# Patient Record
Sex: FEMALE | Race: OTHER | NOT HISPANIC OR LATINO | Employment: FULL TIME | URBAN - METROPOLITAN AREA
[De-identification: names, ages, dates, MRNs, and addresses within clinical notes are randomized per-mention and may not be internally consistent; named-entity substitution may affect disease eponyms.]

---

## 2021-12-10 ENCOUNTER — HOSPITAL ENCOUNTER (EMERGENCY)
Facility: MEDICAL CENTER | Age: 21
End: 2021-12-10
Attending: EMERGENCY MEDICINE
Payer: MEDICAID

## 2021-12-10 VITALS
WEIGHT: 126.98 LBS | DIASTOLIC BLOOD PRESSURE: 70 MMHG | HEART RATE: 96 BPM | OXYGEN SATURATION: 96 % | SYSTOLIC BLOOD PRESSURE: 111 MMHG | TEMPERATURE: 97.1 F | RESPIRATION RATE: 16 BRPM

## 2021-12-10 DIAGNOSIS — N93.8 DYSFUNCTIONAL UTERINE BLEEDING: ICD-10-CM

## 2021-12-10 LAB
ALBUMIN SERPL BCP-MCNC: 4.4 G/DL (ref 3.2–4.9)
ALBUMIN/GLOB SERPL: 1.2 G/DL
ALP SERPL-CCNC: 85 U/L (ref 30–99)
ALT SERPL-CCNC: 9 U/L (ref 2–50)
ANION GAP SERPL CALC-SCNC: 10 MMOL/L (ref 7–16)
APTT PPP: 33.7 SEC (ref 24.7–36)
AST SERPL-CCNC: 18 U/L (ref 12–45)
BASOPHILS # BLD AUTO: 0.6 % (ref 0–1.8)
BASOPHILS # BLD: 0.04 K/UL (ref 0–0.12)
BILIRUB SERPL-MCNC: 0.2 MG/DL (ref 0.1–1.5)
BUN SERPL-MCNC: 9 MG/DL (ref 8–22)
CALCIUM SERPL-MCNC: 9.5 MG/DL (ref 8.4–10.2)
CHLORIDE SERPL-SCNC: 102 MMOL/L (ref 96–112)
CO2 SERPL-SCNC: 25 MMOL/L (ref 20–33)
CREAT SERPL-MCNC: 0.46 MG/DL (ref 0.5–1.4)
EOSINOPHIL # BLD AUTO: 0.11 K/UL (ref 0–0.51)
EOSINOPHIL NFR BLD: 1.7 % (ref 0–6.9)
ERYTHROCYTE [DISTWIDTH] IN BLOOD BY AUTOMATED COUNT: 44.7 FL (ref 35.9–50)
GLOBULIN SER CALC-MCNC: 3.7 G/DL (ref 1.9–3.5)
GLUCOSE SERPL-MCNC: 82 MG/DL (ref 65–99)
HCG SERPL QL: NEGATIVE
HCT VFR BLD AUTO: 37.3 % (ref 37–47)
HGB BLD-MCNC: 11.4 G/DL (ref 12–16)
IMM GRANULOCYTES # BLD AUTO: 0.01 K/UL (ref 0–0.11)
IMM GRANULOCYTES NFR BLD AUTO: 0.2 % (ref 0–0.9)
INR PPP: 0.97 (ref 0.87–1.13)
LYMPHOCYTES # BLD AUTO: 3.25 K/UL (ref 1–4.8)
LYMPHOCYTES NFR BLD: 49 % (ref 22–41)
MCH RBC QN AUTO: 25.1 PG (ref 27–33)
MCHC RBC AUTO-ENTMCNC: 30.6 G/DL (ref 33.6–35)
MCV RBC AUTO: 82.2 FL (ref 81.4–97.8)
MONOCYTES # BLD AUTO: 0.47 K/UL (ref 0–0.85)
MONOCYTES NFR BLD AUTO: 7.1 % (ref 0–13.4)
NEUTROPHILS # BLD AUTO: 2.75 K/UL (ref 2–7.15)
NEUTROPHILS NFR BLD: 41.4 % (ref 44–72)
NRBC # BLD AUTO: 0 K/UL
NRBC BLD-RTO: 0 /100 WBC
PLATELET # BLD AUTO: 395 K/UL (ref 164–446)
PMV BLD AUTO: 8.7 FL (ref 9–12.9)
POTASSIUM SERPL-SCNC: 3.9 MMOL/L (ref 3.6–5.5)
PROT SERPL-MCNC: 8.1 G/DL (ref 6–8.2)
PROTHROMBIN TIME: 12.1 SEC (ref 12–14.6)
RBC # BLD AUTO: 4.54 M/UL (ref 4.2–5.4)
SODIUM SERPL-SCNC: 137 MMOL/L (ref 135–145)
WBC # BLD AUTO: 6.6 K/UL (ref 4.8–10.8)

## 2021-12-10 PROCEDURE — 99284 EMERGENCY DEPT VISIT MOD MDM: CPT

## 2021-12-10 PROCEDURE — 84703 CHORIONIC GONADOTROPIN ASSAY: CPT

## 2021-12-10 PROCEDURE — 85025 COMPLETE CBC W/AUTO DIFF WBC: CPT

## 2021-12-10 PROCEDURE — 85730 THROMBOPLASTIN TIME PARTIAL: CPT

## 2021-12-10 PROCEDURE — 85610 PROTHROMBIN TIME: CPT

## 2021-12-10 PROCEDURE — 80053 COMPREHEN METABOLIC PANEL: CPT

## 2021-12-10 RX ORDER — NORGESTIMATE AND ETHINYL ESTRADIOL 7DAYSX3 28
1 KIT ORAL DAILY
Qty: 90 TABLET | Refills: 4 | Status: SHIPPED | OUTPATIENT
Start: 2021-12-10 | End: 2022-03-10

## 2021-12-11 NOTE — ED TRIAGE NOTES
Chief Complaint   Patient presents with   • Vaginal Bleeding     heavily bleeding for over 1 month, descibed as dark red and recently started passing clots, c/o dizziness     /75   Pulse 82   Temp 35.8 °C (96.5 °F) (Temporal)   Resp 16   SpO2 98%     Pt BIB family for above concern, pt states after receiving the COVID vaccine her periods have been irregular    Has this patient been vaccinated for COVID - YES  If not, would they like to be vaccinated while in the ER if eligible?  n/a  Would the patient like to speak with the ERP about the possibility of receiving the COVID vaccine today before making a decision? n/a

## 2021-12-11 NOTE — ED PROVIDER NOTES
ED Provider Note    CHIEF COMPLAINT  Chief Complaint   Patient presents with   • Vaginal Bleeding     heavily bleeding for over 1 month, descibed as dark red and recently started passing clots, c/o dizziness       HPI  Kathie Vazquez is a 21 y.o. female who presents for evaluation of heavy vaginal bleeding lightheadedness dizziness.  The patient has a history of dysfunctional uterine bleeding.  The patient is a recent refugee from [place].  She had previously been on birth control which did seem to mitigate her heavy menstrual cycles.  She has never been sexually active and denies possibility of pregnancy.  She does not take any anticoagulants or blood thinners.  No family or personal history of bleeding disorder.  She is accompanied by her sister-in-law.  No other symptoms such as high fevers chills night sweats weight loss.    REVIEW OF SYSTEMS  See HPI for further details.  Positive for lightheadedness and dizziness all other systems are negative.     PAST MEDICAL HISTORY  No past medical history on file.  Dysfunctional uterine bleeding  FAMILY HISTORY  No history of bleeding disorder    SOCIAL HISTORY  Social History     Socioeconomic History   • Marital status: Not on file     Spouse name: Not on file   • Number of children: Not on file   • Years of education: Not on file   • Highest education level: Not on file   Occupational History   • Not on file   Tobacco Use   • Smoking status: Never Smoker   • Smokeless tobacco: Never Used   Substance and Sexual Activity   • Alcohol use: Not Currently   • Drug use: Never   • Sexual activity: Not on file   Other Topics Concern   • Not on file   Social History Narrative   • Not on file     Social Determinants of Health     Financial Resource Strain:    • Difficulty of Paying Living Expenses: Not on file   Food Insecurity:    • Worried About Running Out of Food in the Last Year: Not on file   • Ran Out of Food in the Last Year: Not on file   Transportation Needs:    • Lack  of Transportation (Medical): Not on file   • Lack of Transportation (Non-Medical): Not on file   Physical Activity:    • Days of Exercise per Week: Not on file   • Minutes of Exercise per Session: Not on file   Stress:    • Feeling of Stress : Not on file   Social Connections:    • Frequency of Communication with Friends and Family: Not on file   • Frequency of Social Gatherings with Friends and Family: Not on file   • Attends Gnosticism Services: Not on file   • Active Member of Clubs or Organizations: Not on file   • Attends Club or Organization Meetings: Not on file   • Marital Status: Not on file   Intimate Partner Violence:    • Fear of Current or Ex-Partner: Not on file   • Emotionally Abused: Not on file   • Physically Abused: Not on file   • Sexually Abused: Not on file   Housing Stability:    • Unable to Pay for Housing in the Last Year: Not on file   • Number of Places Lived in the Last Year: Not on file   • Unstable Housing in the Last Year: Not on file     [place] refugee  SURGICAL HISTORY  No past surgical history on file.  No surgeries  CURRENT MEDICATIONS  Home Medications    **Home medications have not yet been reviewed for this encounter**         ALLERGIES  Not on File    PHYSICAL EXAM  VITAL SIGNS: /75   Pulse 82   Temp 35.8 °C (96.5 °F) (Temporal)   Resp 16   Wt 57.6 kg (126 lb 15.8 oz)   SpO2 98%       Constitutional: Well developed, Well nourished, No acute distress, Non-toxic appearance.   HENT: Normocephalic, Atraumatic, Bilateral external ears normal, Oropharynx moist, No oral exudates, Nose normal.   Eyes: PERRLA, EOMI, Conjunctiva normal, No discharge.   Neck: Normal range of motion, No tenderness, Supple, No stridor.   Cardiovascular: Normal heart rate, Normal rhythm, No murmurs, No rubs, No gallops.   Thorax & Lungs: Normal breath sounds, No respiratory distress, No wheezing, No chest tenderness.   Abdomen: Bowel sounds normal, Soft, No tenderness, No masses, No pulsatile  masses.   Skin: Warm, Dry, No erythema, No rash.   Extremities: Intact distal pulses, No edema, No tenderness, No cyanosis, No clubbing.   Musculoskeletal: Good range of motion in all major joints. No tenderness to palpation or major deformities noted.   Neurologic: Alert & oriented x 3, Normal motor function, Normal sensory function, No focal deficits noted.   Psychiatric: Anxious  Results for orders placed or performed during the hospital encounter of 12/10/21   HCG QUAL SERUM   Result Value Ref Range    Beta-Hcg Qualitative Serum Negative Negative   CBC WITH DIFFERENTIAL   Result Value Ref Range    WBC 6.6 4.8 - 10.8 K/uL    RBC 4.54 4.20 - 5.40 M/uL    Hemoglobin 11.4 (L) 12.0 - 16.0 g/dL    Hematocrit 37.3 37.0 - 47.0 %    MCV 82.2 81.4 - 97.8 fL    MCH 25.1 (L) 27.0 - 33.0 pg    MCHC 30.6 (L) 33.6 - 35.0 g/dL    RDW 44.7 35.9 - 50.0 fL    Platelet Count 395 164 - 446 K/uL    MPV 8.7 (L) 9.0 - 12.9 fL    Neutrophils-Polys 41.40 (L) 44.00 - 72.00 %    Lymphocytes 49.00 (H) 22.00 - 41.00 %    Monocytes 7.10 0.00 - 13.40 %    Eosinophils 1.70 0.00 - 6.90 %    Basophils 0.60 0.00 - 1.80 %    Immature Granulocytes 0.20 0.00 - 0.90 %    Nucleated RBC 0.00 /100 WBC    Neutrophils (Absolute) 2.75 2.00 - 7.15 K/uL    Lymphs (Absolute) 3.25 1.00 - 4.80 K/uL    Monos (Absolute) 0.47 0.00 - 0.85 K/uL    Eos (Absolute) 0.11 0.00 - 0.51 K/uL    Baso (Absolute) 0.04 0.00 - 0.12 K/uL    Immature Granulocytes (abs) 0.01 0.00 - 0.11 K/uL    NRBC (Absolute) 0.00 K/uL   Comp Metabolic Panel   Result Value Ref Range    Sodium 137 135 - 145 mmol/L    Potassium 3.9 3.6 - 5.5 mmol/L    Chloride 102 96 - 112 mmol/L    Co2 25 20 - 33 mmol/L    Anion Gap 10.0 7.0 - 16.0    Glucose 82 65 - 99 mg/dL    Bun 9 8 - 22 mg/dL    Creatinine 0.46 (L) 0.50 - 1.40 mg/dL    Calcium 9.5 8.4 - 10.2 mg/dL    AST(SGOT) 18 12 - 45 U/L    ALT(SGPT) 9 2 - 50 U/L    Alkaline Phosphatase 85 30 - 99 U/L    Total Bilirubin 0.2 0.1 - 1.5 mg/dL    Albumin 4.4  3.2 - 4.9 g/dL    Total Protein 8.1 6.0 - 8.2 g/dL    Globulin 3.7 (H) 1.9 - 3.5 g/dL    A-G Ratio 1.2 g/dL   Prothrombin Time   Result Value Ref Range    PT 12.1 12.0 - 14.6 sec    INR 0.97 0.87 - 1.13   APTT   Result Value Ref Range    APTT 33.7 24.7 - 36.0 sec   ESTIMATED GFR   Result Value Ref Range    GFR If African American >60 >60 mL/min/1.73 m 2    GFR If Non African American >60 >60 mL/min/1.73 m 2       COURSE & MEDICAL DECISION MAKING  Pertinent Labs & Imaging studies reviewed. (See chart for details)  Patient presents here with heavy vaginal bleeding.  She is not sexually active has no known uterine abnormality.  She reportedly had an ultrasound in [facility] several months ago which was by her report normal.  Hemoglobin is reassuring.  Serum pregnancy is negative.  I see no clear indication for emergent pelvic ultrasound.  We will start her on Ortho Tri-Cyclen to help mitigate heavy menstrual cycles.  We will refer her to a local gynecologist as well    FINAL IMPRESSION  1.  Dysfunctional uterine bleeding      Electronically signed by: Rodolfo Jensen M.D., 12/10/2021 5:10 PM

## 2022-12-02 ENCOUNTER — HOSPITAL ENCOUNTER (EMERGENCY)
Facility: MEDICAL CENTER | Age: 22
End: 2022-12-02
Attending: EMERGENCY MEDICINE
Payer: MEDICAID

## 2022-12-02 VITALS
BODY MASS INDEX: 20.72 KG/M2 | HEART RATE: 105 BPM | WEIGHT: 124.34 LBS | TEMPERATURE: 99 F | OXYGEN SATURATION: 99 % | SYSTOLIC BLOOD PRESSURE: 104 MMHG | HEIGHT: 65 IN | DIASTOLIC BLOOD PRESSURE: 67 MMHG | RESPIRATION RATE: 18 BRPM

## 2022-12-02 DIAGNOSIS — J10.1 INFLUENZA A: ICD-10-CM

## 2022-12-02 DIAGNOSIS — E86.0 DEHYDRATION: ICD-10-CM

## 2022-12-02 LAB
ALBUMIN SERPL BCP-MCNC: 4.2 G/DL (ref 3.2–4.9)
ALBUMIN/GLOB SERPL: 1.1 G/DL
ALP SERPL-CCNC: 57 U/L (ref 30–99)
ALT SERPL-CCNC: 11 U/L (ref 2–50)
ANION GAP SERPL CALC-SCNC: 12 MMOL/L (ref 7–16)
AST SERPL-CCNC: 24 U/L (ref 12–45)
BASOPHILS # BLD AUTO: 0.1 % (ref 0–1.8)
BASOPHILS # BLD: 0.01 K/UL (ref 0–0.12)
BILIRUB SERPL-MCNC: 0.3 MG/DL (ref 0.1–1.5)
BUN SERPL-MCNC: 5 MG/DL (ref 8–22)
CALCIUM SERPL-MCNC: 9.5 MG/DL (ref 8.4–10.2)
CHLORIDE SERPL-SCNC: 102 MMOL/L (ref 96–112)
CO2 SERPL-SCNC: 23 MMOL/L (ref 20–33)
CREAT SERPL-MCNC: 0.58 MG/DL (ref 0.5–1.4)
EOSINOPHIL # BLD AUTO: 0.01 K/UL (ref 0–0.51)
EOSINOPHIL NFR BLD: 0.1 % (ref 0–6.9)
ERYTHROCYTE [DISTWIDTH] IN BLOOD BY AUTOMATED COUNT: 39.8 FL (ref 35.9–50)
FLUAV RNA SPEC QL NAA+PROBE: POSITIVE
FLUBV RNA SPEC QL NAA+PROBE: NEGATIVE
GFR SERPLBLD CREATININE-BSD FMLA CKD-EPI: 130 ML/MIN/1.73 M 2
GLOBULIN SER CALC-MCNC: 3.8 G/DL (ref 1.9–3.5)
GLUCOSE SERPL-MCNC: 121 MG/DL (ref 65–99)
HCG SERPL QL: NEGATIVE
HCT VFR BLD AUTO: 44.4 % (ref 37–47)
HGB BLD-MCNC: 14.5 G/DL (ref 12–16)
IMM GRANULOCYTES # BLD AUTO: 0.02 K/UL (ref 0–0.11)
IMM GRANULOCYTES NFR BLD AUTO: 0.2 % (ref 0–0.9)
LYMPHOCYTES # BLD AUTO: 1.13 K/UL (ref 1–4.8)
LYMPHOCYTES NFR BLD: 12.9 % (ref 22–41)
MCH RBC QN AUTO: 28.5 PG (ref 27–33)
MCHC RBC AUTO-ENTMCNC: 32.7 G/DL (ref 33.6–35)
MCV RBC AUTO: 87.2 FL (ref 81.4–97.8)
MONOCYTES # BLD AUTO: 0.53 K/UL (ref 0–0.85)
MONOCYTES NFR BLD AUTO: 6 % (ref 0–13.4)
NEUTROPHILS # BLD AUTO: 7.07 K/UL (ref 2–7.15)
NEUTROPHILS NFR BLD: 80.7 % (ref 44–72)
NRBC # BLD AUTO: 0 K/UL
NRBC BLD-RTO: 0 /100 WBC
PLATELET # BLD AUTO: 303 K/UL (ref 164–446)
PMV BLD AUTO: 8.8 FL (ref 9–12.9)
POTASSIUM SERPL-SCNC: 4.1 MMOL/L (ref 3.6–5.5)
PROT SERPL-MCNC: 8 G/DL (ref 6–8.2)
RBC # BLD AUTO: 5.09 M/UL (ref 4.2–5.4)
RSV RNA SPEC QL NAA+PROBE: NEGATIVE
SARS-COV-2 RNA RESP QL NAA+PROBE: NOTDETECTED
SODIUM SERPL-SCNC: 137 MMOL/L (ref 135–145)
SPECIMEN SOURCE: ABNORMAL
WBC # BLD AUTO: 8.8 K/UL (ref 4.8–10.8)

## 2022-12-02 PROCEDURE — 99285 EMERGENCY DEPT VISIT HI MDM: CPT

## 2022-12-02 PROCEDURE — 96375 TX/PRO/DX INJ NEW DRUG ADDON: CPT

## 2022-12-02 PROCEDURE — 84703 CHORIONIC GONADOTROPIN ASSAY: CPT

## 2022-12-02 PROCEDURE — 85025 COMPLETE CBC W/AUTO DIFF WBC: CPT

## 2022-12-02 PROCEDURE — C9803 HOPD COVID-19 SPEC COLLECT: HCPCS | Performed by: EMERGENCY MEDICINE

## 2022-12-02 PROCEDURE — 96374 THER/PROPH/DIAG INJ IV PUSH: CPT

## 2022-12-02 PROCEDURE — C9803 HOPD COVID-19 SPEC COLLECT: HCPCS

## 2022-12-02 PROCEDURE — 80053 COMPREHEN METABOLIC PANEL: CPT

## 2022-12-02 PROCEDURE — 700111 HCHG RX REV CODE 636 W/ 250 OVERRIDE (IP): Performed by: EMERGENCY MEDICINE

## 2022-12-02 PROCEDURE — 36415 COLL VENOUS BLD VENIPUNCTURE: CPT

## 2022-12-02 PROCEDURE — 0241U HCHG SARS-COV-2 COVID-19 NFCT DS RESP RNA 4 TRGT MIC: CPT

## 2022-12-02 PROCEDURE — 700105 HCHG RX REV CODE 258: Performed by: EMERGENCY MEDICINE

## 2022-12-02 RX ORDER — ONDANSETRON 4 MG/1
4 TABLET, ORALLY DISINTEGRATING ORAL EVERY 8 HOURS PRN
Qty: 10 TABLET | Refills: 0 | Status: SHIPPED | OUTPATIENT
Start: 2022-12-02

## 2022-12-02 RX ORDER — SODIUM CHLORIDE 9 MG/ML
1000 INJECTION, SOLUTION INTRAVENOUS ONCE
Status: COMPLETED | OUTPATIENT
Start: 2022-12-02 | End: 2022-12-02

## 2022-12-02 RX ORDER — KETOROLAC TROMETHAMINE 30 MG/ML
15 INJECTION, SOLUTION INTRAMUSCULAR; INTRAVENOUS ONCE
Status: COMPLETED | OUTPATIENT
Start: 2022-12-02 | End: 2022-12-02

## 2022-12-02 RX ORDER — ONDANSETRON 2 MG/ML
4 INJECTION INTRAMUSCULAR; INTRAVENOUS ONCE
Status: COMPLETED | OUTPATIENT
Start: 2022-12-02 | End: 2022-12-02

## 2022-12-02 RX ADMIN — SODIUM CHLORIDE 1000 ML: 9 INJECTION, SOLUTION INTRAVENOUS at 09:46

## 2022-12-02 RX ADMIN — ONDANSETRON 4 MG: 2 INJECTION INTRAMUSCULAR; INTRAVENOUS at 09:47

## 2022-12-02 RX ADMIN — KETOROLAC TROMETHAMINE 15 MG: 30 INJECTION, SOLUTION INTRAMUSCULAR; INTRAVENOUS at 09:47

## 2022-12-02 ASSESSMENT — PAIN DESCRIPTION - PAIN TYPE
TYPE: ACUTE PAIN
TYPE: ACUTE PAIN

## 2022-12-02 ASSESSMENT — FIBROSIS 4 INDEX: FIB4 SCORE: 0.35

## 2022-12-02 NOTE — ED NOTES
Discharged in stable condition, alert and oriented. Home medications and follow up instructed. Removed IV line and applied pressure. Patient verbalized understanding of the information given.

## 2022-12-02 NOTE — ED PROVIDER NOTES
"ED Provider Note    Scribed for Tamiko Paredes M.D. by Mike Cordero. 12/2/2022, 9:14 AM.    Primary care provider: Pcp Pt States None  Means of arrival: Walk in  History obtained from: Patient  History limited by: None    CHIEF COMPLAINT  Chief Complaint   Patient presents with    Flu Like Symptoms     Body aches, n/v/d, fevers x 3-4 days.        TONI Vazquez is a 22 y.o. female who presents to the Emergency Department for fever and sore throat onset 4 days ago. Patient reports the symptoms began with a sore throat, chills, and body aches. She states today she has been experiencing vomiting, diarrhea, and worsening sore throat. She had 3 episodes of diarrhea today and multiple episodes of vomiting with increased abdominal pain. Patient is currently on her menstrual period. She describes her abdominal pain as cramping but a little different than normal period pain. She has been drinking fluids regularly. Patient has been medicating with Dayquil, Nyquil, and Advil at home. Denies illness at home. Denies dysuria. Patient denies any major past medical history or allergies.     REVIEW OF SYSTEMS  Pertinent positives include fever, sore throat, abdominal pain, nausea, diarrhea, body aches. Pertinent negatives include no dysuria. All other systems reviewed and negative.    PAST MEDICAL HISTORY   None noted    SURGICAL HISTORY  patient denies any surgical history    SOCIAL HISTORY  Social History     Tobacco Use    Smoking status: Never    Smokeless tobacco: Never   Substance Use Topics    Alcohol use: Not Currently    Drug use: Never      Social History     Substance and Sexual Activity   Drug Use Never       FAMILY HISTORY  None noted    CURRENT MEDICATIONS  No current outpatient medications    ALLERGIES  No Known Allergies    PHYSICAL EXAM  VITAL SIGNS: /65   Pulse (!) 129   Temp 37.1 °C (98.7 °F) (Temporal)   Resp 18   Ht 1.65 m (5' 4.96\")   Wt 56.4 kg (124 lb 5.4 oz)   LMP 12/02/2022   SpO2 99%   BMI " 20.72 kg/m²   Constitutional: uncomfortable appearing, in no respiratory distress  HENT: No signs of trauma, Bilateral external ears normal, Nose normal.   Eyes: Pupils are equal and reactive, Conjunctiva normal, Non-icteric.   Neck: Normal range of motion, No tenderness, Supple, No stridor.   Cardiovascular: Mildly tachycardic rate and rhythm, no murmurs.   Thorax & Lungs: Normal breath sounds, No respiratory distress, No wheezing, No chest tenderness.   Abdomen: Bowel sounds normal, Soft, mid lower abdominal tenderness, No masses, No peritoneal signs.  Skin: Warm, Dry, No erythema, No rash.  Musculoskeletal:  No major deformities noted.  Neurologic: Alert, moving all extremities without difficulty, no focal deficits.      LABS  Labs Reviewed   COV-2, FLU A/B, AND RSV BY PCR ([place]) - Abnormal; Notable for the following components:       Result Value    Influenza virus A RNA POSITIVE (*)     All other components within normal limits   CBC WITH DIFFERENTIAL - Abnormal; Notable for the following components:    MCHC 32.7 (*)     MPV 8.8 (*)     Neutrophils-Polys 80.70 (*)     Lymphocytes 12.90 (*)     All other components within normal limits   COMP METABOLIC PANEL - Abnormal; Notable for the following components:    Glucose 121 (*)     Bun 5 (*)     Globulin 3.8 (*)     All other components within normal limits   HCG QUAL SERUM   ESTIMATED GFR   URINALYSIS     All labs reviewed by me.    COURSE & MEDICAL DECISION MAKING  Pertinent Labs & Imaging studies reviewed. (See chart for details)    Differential diagnoses include but are not limited to: Influenza vs dehydration vs RSV vs COVID    9:14 AM - Patient seen and examined at bedside. Patient will be treated with NS infusion,ondansetron 4 mg and ketorolac 15 mg injection. Ordered CBC w/diff, CMP, UA, HCG qual, CoV-2, Flu A/B, and RSV to evaluate her symptoms. I informed the patient this is most likely the flu but I would like to additional blood work to confirm  "there is no infection.    Lab/imaging results were reviewed, which showed the patient has influenza A.     10:30 AM - Patient reevaluated at bedside.  I do think patient symptoms are consistent with influenza A.  She is not hypoxic her heart rate was improved after IV fluids.  I recommended continued supportive care.  She does not have any risk factors and Tamiflu is on shortage at this time therefore this was not prescribed.  Discussed plan for discharge; and to return to the [facility] ED with any new or worsening symptoms, including fever, vomiting, diarrhea. Patient was given the opportunity for questions. I addressed all questions or concerns at this time and they verbalize agreement to the plan of care. Patient will be prescribed ondansetron 4 mg tablet for her symptoms.  Review of vital signs at this visit show: /67   Pulse (!) 105   Temp 37.2 °C (99 °F) (Temporal)   Resp 18   Ht 1.65 m (5' 4.96\")   Wt 56.4 kg (124 lb 5.4 oz)   LMP 12/02/2022   SpO2 99%   BMI 20.72 kg/m²         HYDRATION: Based on the patient's presentation of Acute Diarrhea the patient was given IV fluids. IV Hydration was used because oral hydration was not adequate alone. Upon recheck following hydration, the patient was improved.      The patient will return for new or worsening symptoms and is stable at the time of discharge. Patient was given return precautions. Patient and/or family member verbalizes understanding and will comply.    DISPOSITION:  Patient will be discharged home in stable condition.    FOLLOW UP:  [facility], Emergency Dept  [address]  [facility] [address]  [phone]    Return to the emergency department for worsening vomiting, difficulty breathing or other concerns.    OUTPATIENT MEDICATIONS:  Discharge Medication List as of 12/2/2022 10:50 AM        START taking these medications    Details   ondansetron (ZOFRAN ODT) 4 MG TABLET DISPERSIBLE Take 1 Tablet by mouth " every 8 hours as needed for Nausea/Vomiting., Disp-10 Tablet, R-0, Print Rx Paper             FINAL IMPRESSION  1. Influenza A    2. Dehydration           This dictation has been created using voice recognition software and/or scribes. The accuracy of the dictation is limited by the abilities of the software and the expertise of the scribes. I expect there may be some errors of grammar and possibly content. I made every attempt to manually correct the errors within my dictation. However, errors related to voice recognition software and/or scribes may still exist and should be interpreted within the appropriate context.     Mike DAMIAN (Scribe), am scribing for, and in the presence of, Tamiko Paredes M.D..    Electronically signed by: Mike Cordero (Scribe), 12/2/2022    Tamiko DAMIAN M.D. personally performed the services described in this documentation, as scribed by Mike Cordero in my presence, and it is both accurate and complete.    The note accurately reflects work and decisions made by me.  Tamiko Paredes M.D.  12/2/2022  4:25 PM

## 2022-12-02 NOTE — ED TRIAGE NOTES
"Chief Complaint   Patient presents with   • Flu Like Symptoms     Body aches, n/v/d, fevers x 3-4 days.    C/o profuse diarrhea.  States she was taking abx 1 week ago \"for my gums\".   formerly [facility]  [id number] used.  "

## 2023-03-17 ENCOUNTER — HOSPITAL ENCOUNTER (EMERGENCY)
Facility: MEDICAL CENTER | Age: 23
End: 2023-03-17
Attending: EMERGENCY MEDICINE
Payer: MEDICAID

## 2023-03-17 VITALS
DIASTOLIC BLOOD PRESSURE: 62 MMHG | WEIGHT: 133.16 LBS | TEMPERATURE: 98.6 F | OXYGEN SATURATION: 99 % | HEART RATE: 79 BPM | HEIGHT: 63 IN | RESPIRATION RATE: 16 BRPM | SYSTOLIC BLOOD PRESSURE: 98 MMHG | BODY MASS INDEX: 23.59 KG/M2

## 2023-03-17 DIAGNOSIS — N93.9 VAGINAL BLEEDING: Primary | ICD-10-CM

## 2023-03-17 PROCEDURE — 99284 EMERGENCY DEPT VISIT MOD MDM: CPT

## 2023-03-17 PROCEDURE — 96372 THER/PROPH/DIAG INJ SC/IM: CPT

## 2023-03-17 PROCEDURE — 700111 HCHG RX REV CODE 636 W/ 250 OVERRIDE (IP)

## 2023-03-17 RX ORDER — MEDROXYPROGESTERONE ACETATE 150 MG/ML
150 INJECTION, SUSPENSION INTRAMUSCULAR ONCE
Status: COMPLETED | OUTPATIENT
Start: 2023-03-17 | End: 2023-03-17

## 2023-03-17 RX ADMIN — MEDROXYPROGESTERONE ACETATE 150 MG: 150 INJECTION, SUSPENSION INTRAMUSCULAR at 17:53

## 2023-03-17 ASSESSMENT — FIBROSIS 4 INDEX: FIB4 SCORE: 0.57

## 2023-03-17 NOTE — ED PROVIDER NOTES
"  ER Provider Note    Scribed for Jose Enrique Sutton Ii, M.d. by Jessee Flood. 3/17/2023  4:23 PM    Primary Care Provider: Pcp Pt States None    CHIEF COMPLAINT  Chief Complaint   Patient presents with    Other     Here for an injection prescribed by her PCP, usually this pt gets her shot at her PCP office but it is closed today.     LIMITATION TO HISTORY   Select: : None    HPI/ROS  OUTSIDE HISTORIAN(S):  Family Sister - helped in comprehension of information due to patient's english being limited.    EXTERNAL RECORDS REVIEWED  none    Kathie Vazquez is a 23 y.o. female who presents to the ED for an injection of Medroxyprogesterone that was prescribed by Dr. Olvera (OBGYN) . Patient states that she has never received the medication before but was experiencing heavy vaginal bleeding today and could not go to the office due to it being closed. Patient reports taking 800 mg of Ibuprofen every 8 hours and states using multiple pads to stop her bleeding. She denies any current pain. No alleviating or exacerbating factors noted. Patient has no known allergies.    PAST MEDICAL HISTORY  No past medical history noted.    SURGICAL HISTORY  No past surgical history noted.    FAMILY HISTORY  No family history noted.    SOCIAL HISTORY   reports that she has never smoked. She has never used smokeless tobacco. She reports that she does not currently use alcohol. She reports that she does not use drugs.    CURRENT MEDICATIONS  Discharge Medication List as of 3/17/2023  6:12 PM        CONTINUE these medications which have NOT CHANGED    Details   ondansetron (ZOFRAN ODT) 4 MG TABLET DISPERSIBLE Take 1 Tablet by mouth every 8 hours as needed for Nausea/Vomiting., Disp-10 Tablet, R-0, Print Rx Paper             ALLERGIES  Patient has no known allergies.    PHYSICAL EXAM  /68   Pulse 84   Temp 36.9 °C (98.5 °F) (Temporal)   Resp 16   Ht 1.6 m (5' 3\")   Wt 60.4 kg (133 lb 2.5 oz)   LMP 03/17/2023 (Exact Date)   SpO2 97%  "  BMI 23.59 kg/m²     Physical Exam  Vitals and nursing note reviewed.   Constitutional:       Appearance: Normal appearance.   Cardiovascular:      Rate and Rhythm: Normal rate.   Pulmonary:      Effort: Pulmonary effort is normal.   Neurological:      Mental Status: She is alert.     COURSE & MEDICAL DECISION MAKING    ED Observation Status? No, patient does not require observation at this time.    INITIAL ASSESSMENT AND PLAN  Care Narrative: This is an evaluation of a 23-year-old woman who presents with request for Depo-Provera injection.  She presents with the prescribed medication that is for IM injection.  She was unable to go to her provider today because the clinic is closed and she says she is having frequent vaginal bleeding started over the past few days.  Plan to get in touch with her provider before giving medications.  She is not having any signs of severe anemia.  No lightheadedness with standing.  No rapid heart rate.      4:23 PM - Patient seen and evaluated at bedside. I informed her that I will get in contact with the patient's provider who prescrbed the medication in order to receive clearance to treat her with it.  Discussed with pharmacist, Ryann Herrera  We will be able to administer the prescribed medication here in the ER.    5:51 PM - I spoke with Dr. Elliott regarding the patient.  Agrees with plan to give medication.  Ms. Vazquez will go to clinic on Monday. Treated patient with Medroxyprogesterone 150 mg injection. Patient had the opportunity to ask any questions. The plan for discharge was discussed with them and they were told to return for any new or worsening symptoms. She was also informed of the plans for follow up. Patient is understanding and agreeable to the plan for discharge.                   DISPOSITION AND DISCUSSIONS  I have discussed management of the patient with the following physicians and AMARJIT's: Dr Elliott (OBGYN).       The patient will return for new or  worsening symptoms and is stable at the time of discharge.    The patient is referred to a primary physician for blood pressure management, diabetic screening, and for all other preventative health concerns.    DISPOSITION:  Patient will be discharged home in stable condition.    FOLLOW UP:  [facility] [address] [place] [address]  [place] [address]  [phone]    Go to   appointment on Monday      FINAL IMPRESSION   1. Vaginal bleeding Active     Jessee DAMIAN (Scribe), am scribing for, and in the presence of, GINA Laird II.    Electronically signed by: Jessee Flood (Scribe), 3/17/2023    Jose Enrique DAMIAN II, M* personally performed the services described in this documentation, as scribed by Jessee Flood in my presence, and it is both accurate and complete.    The note accurately reflects work and decisions made by me.  Jose Enrique Sutton II, M.D.  3/17/2023  11:32 PM

## 2023-03-17 NOTE — ED TRIAGE NOTES
Chief Complaint   Patient presents with    Other     Here for an injection prescribed by her PCP, usually this pt gets her shot at her PCP office but it is closed today.

## 2023-11-29 ENCOUNTER — HOSPITAL ENCOUNTER (OUTPATIENT)
Dept: RADIOLOGY | Facility: MEDICAL CENTER | Age: 23
End: 2023-11-29
Attending: NURSE PRACTITIONER
Payer: MEDICAID

## 2023-11-29 DIAGNOSIS — R79.89 ELEVATED TSH: ICD-10-CM

## 2023-12-30 ENCOUNTER — HOSPITAL ENCOUNTER (EMERGENCY)
Facility: MEDICAL CENTER | Age: 23
End: 2023-12-31
Attending: STUDENT IN AN ORGANIZED HEALTH CARE EDUCATION/TRAINING PROGRAM
Payer: MEDICAID

## 2023-12-30 VITALS
TEMPERATURE: 97.8 F | OXYGEN SATURATION: 98 % | BODY MASS INDEX: 24.88 KG/M2 | WEIGHT: 145.72 LBS | DIASTOLIC BLOOD PRESSURE: 74 MMHG | HEIGHT: 64 IN | SYSTOLIC BLOOD PRESSURE: 97 MMHG | HEART RATE: 93 BPM | RESPIRATION RATE: 18 BRPM

## 2023-12-30 DIAGNOSIS — R42 DIZZINESS: ICD-10-CM

## 2023-12-30 PROCEDURE — 99281 EMR DPT VST MAYX REQ PHY/QHP: CPT

## 2023-12-30 ASSESSMENT — FIBROSIS 4 INDEX: FIB4 SCORE: 0.57

## 2023-12-31 NOTE — ED NOTES
Patient declined the lab tests and requesting to speak with the doctor.    MD at bedside talking to patient and agrees with the physician to not get blood testing and get an MRI as outpatient.

## 2023-12-31 NOTE — ED PROVIDER NOTES
ED Provider Note    CHIEF COMPLAINT  Chief Complaint   Patient presents with    Dizziness     Patient states she has been dizzy for the past few months. Patient's dizziness as progressively increased. She states she would like to get MRI imaging of her head and body.        EXTERNAL RECORDS REVIEWED  Outpatient Notes patient was seen on 3/17/2023 in the emergency department for a medroxyprogesterone injection.  She received this and was discharged.    HPI/ROS  LIMITATION TO HISTORY   Select: : None      Kathie Vazquez is a 23 y.o. female who presents to the emergency department requesting an MRI of her brain.  Patient reports a history of a pituitary adenoma and elevated prolactin level in the setting of increasing dizziness for months.  She states that she recently obtained health insurance and is attempting to get all of her medical care done prior to losing her insurance in a few months.  She states that she was ordered for an MRI of her brain by her PCP but it is not scheduled until later in January so [facility]  She denies any new symptoms currently, stating all of her symptoms have been chronic over some time.    PAST MEDICAL HISTORY       SURGICAL HISTORY  patient denies any surgical history    FAMILY HISTORY  History reviewed. No pertinent family history.    SOCIAL HISTORY  Social History     Tobacco Use    Smoking status: Never    Smokeless tobacco: Never   Substance and Sexual Activity    Alcohol use: Not Currently    Drug use: Never    Sexual activity: Not on file       CURRENT MEDICATIONS  Home Medications       Reviewed by José Miguel Parikh R.N. (Registered Nurse) on 12/30/23 at 2305  Med List Status: Not Addressed     Medication Last Dose Status   ondansetron (ZOFRAN ODT) 4 MG TABLET DISPERSIBLE  Active                    ALLERGIES  No Known Allergies    PHYSICAL EXAM  VITAL SIGNS: BP 97/74   Pulse 93   Temp 36.6 °C (97.8 °F) (Temporal)   Resp 18   Ht  "1.626 m (5' 4\")   Wt 66.1 kg (145 lb 11.6 oz)   LMP 12/29/2023 (Exact Date)   SpO2 98%   BMI 25.01 kg/m²    Constitutional: No acute distress  HEENT: Atraumatic, normocephalic, pupils are equal round reactive to light, EOMI, visual fields intact nose normal, mouth shows moist mucous membranes  Neck: Supple, no JVD, no tracheal deviation  Cardiovascular: Regular rate and rhythm, no murmur, rub or gallop, 2+ pulses peripherally x4  Thorax & Lungs: No respiratory distress, no wheezes, rales or rhonchi, no chest wall tenderness.  GI: Soft, non-distended, non-tender, no rebound  Skin: Warm, dry, no acute rash or lesion  Musculoskeletal: Moving all extremities, no acute deformity, no edema, no tenderness  Neurologic: A&Ox3, at baseline mentation, cranial nerves II through XII are grossly intact, no sensory deficit, no ataxia  Psychiatric: Appropriate affect for situation at this time      COURSE & MEDICAL DECISION MAKING    ED Observation Status? No; Patient does not meet criteria for ED Observation.     INITIAL ASSESSMENT, COURSE AND PLAN  Care Narrative:     Patient presents to the emergency department requesting an MRI of her brain to be performed.  States that she was told by her PCP that she could get it expedited if she came to the emergency department.  Unclear if this is true and no documentation available in patient's record.  No acute symptoms today and an unremarkable neurologic examination.  Patient ambulatory with a steady gait with no visual deficits, headache, seizures or additional symptoms concerning for acute intracranial hemorrhage, venous sinus thrombosis.  Does report a history of a pituitary adenoma, elevated prolactin level and I do suspect would warrant an MRI however not emergently.  I explained this to the patient who declined any additional laboratory or imaging workup tonight.  She states she will call her PCP in 1 to 2 days for recheck and continue with her prior plan to get the MRI " performed outpatient.  She was discharged in stable condition with return precautions discussed.    ADDITIONAL PROBLEM LIST  None    DISPOSITION AND DISCUSSIONS  I have discussed management of the patient with the following physicians and AMARJIT's: None    Discussion of management with other QHP or appropriate source(s): None     Escalation of care considered, and ultimately not performed:after discussion with the patient / family, they have elected to decline an escalation in care    FINAL IMPRESSION  1. Dizziness        PRESCRIPTIONS  Discharge Medication List as of 12/31/2023 12:50 AM          FOLLOW UP  [facility], Emergency Dept  [address]  [place] [address]  [phone]    As needed, If symptoms worsen        -DISCHARGE-      Electronically signed by: Kee Rutledge M.D., 12/31/2023 12:13 AM

## 2023-12-31 NOTE — ED TRIAGE NOTES
"Patient presents to the ER with the following complaints    Chief Complaint   Patient presents with    Dizziness     Patient states she has been dizzy for the past few months. Patient's dizziness as progressively increased. She states she would like to get MRI imaging of her head and body.      Ht 1.626 m (5' 4\")   Wt 66.1 kg (145 lb 11.6 oz)   LMP 12/29/2023 (Exact Date)   BMI 25.01 kg/m²       "

## 2024-02-10 ENCOUNTER — HOSPITAL ENCOUNTER (OUTPATIENT)
Dept: RADIOLOGY | Facility: MEDICAL CENTER | Age: 24
End: 2024-02-10
Attending: NURSE PRACTITIONER
Payer: MEDICAID

## 2024-02-10 DIAGNOSIS — E22.1 HYPERPROLACTINEMIA (HCC): ICD-10-CM

## 2024-02-10 PROCEDURE — 700117 HCHG RX CONTRAST REV CODE 255: Mod: UD | Performed by: NURSE PRACTITIONER

## 2024-02-10 PROCEDURE — 70553 MRI BRAIN STEM W/O & W/DYE: CPT

## 2024-02-10 PROCEDURE — A9579 GAD-BASE MR CONTRAST NOS,1ML: HCPCS | Mod: UD | Performed by: NURSE PRACTITIONER

## 2024-02-10 RX ADMIN — GADOTERIDOL 14 ML: 279.3 INJECTION, SOLUTION INTRAVENOUS at 16:46

## 2025-07-19 ENCOUNTER — HOSPITAL ENCOUNTER (EMERGENCY)
Facility: MEDICAL CENTER | Age: 25
End: 2025-07-20
Attending: STUDENT IN AN ORGANIZED HEALTH CARE EDUCATION/TRAINING PROGRAM
Payer: MEDICAID

## 2025-07-19 VITALS
HEART RATE: 79 BPM | TEMPERATURE: 98.8 F | DIASTOLIC BLOOD PRESSURE: 66 MMHG | SYSTOLIC BLOOD PRESSURE: 93 MMHG | BODY MASS INDEX: 22.44 KG/M2 | OXYGEN SATURATION: 98 % | WEIGHT: 134.7 LBS | HEIGHT: 65 IN | RESPIRATION RATE: 16 BRPM

## 2025-07-19 DIAGNOSIS — N89.8 VAGINAL DISCHARGE: ICD-10-CM

## 2025-07-19 DIAGNOSIS — B96.89 BV (BACTERIAL VAGINOSIS): Primary | ICD-10-CM

## 2025-07-19 DIAGNOSIS — N76.0 BV (BACTERIAL VAGINOSIS): Primary | ICD-10-CM

## 2025-07-19 DIAGNOSIS — N94.19 DYSPAREUNIA DUE TO MEDICAL CONDITION IN FEMALE: ICD-10-CM

## 2025-07-19 LAB
BACTERIA GENITAL QL WET PREP: NORMAL
SIGNIFICANT IND 70042: NORMAL
SITE SITE: NORMAL
SOURCE SOURCE: NORMAL

## 2025-07-19 PROCEDURE — 87480 CANDIDA DNA DIR PROBE: CPT

## 2025-07-19 PROCEDURE — 87660 TRICHOMONAS VAGIN DIR PROBE: CPT

## 2025-07-19 PROCEDURE — 99284 EMERGENCY DEPT VISIT MOD MDM: CPT

## 2025-07-19 PROCEDURE — 87510 GARDNER VAG DNA DIR PROBE: CPT

## 2025-07-19 NOTE — LETTER
7/23/2025               Kathie Vazquez  [address]  [facility] [address]        Dear Kathie (MR#[id number])    As we have been unable to contact you by phone, this letter is sent in regards to your recent visit to the [facility] Emergency Department on 7/19/2025. During the visit, tests were performed to assist the physician in a medical diagnosis. A review of those tests requires that we notify you of the following:    Your swab was POSITIVE for candida species (yeast infection), and further treatment may be necessary. The currently prescribed antibiotic (metronidazole) may not be effective in treating your infection. IF YOU ARE NOT FEELING BETTER PLEASE CONTACT ME AS SOON AS POSSIBLE AT THE NUMBER BELOW.       Thank you for your cooperation in the matter.    Sincerely,  ED Culture Follow-Up Staff  Carol Combs, PharmD    [facility], Emergency Department  [address] [place] [id number] [phone]

## 2025-07-20 LAB
CANDIDA DNA VAG QL PROBE+SIG AMP: POSITIVE
G VAGINALIS DNA VAG QL PROBE+SIG AMP: NEGATIVE
T VAGINALIS DNA VAG QL PROBE+SIG AMP: NEGATIVE

## 2025-07-20 PROCEDURE — 700102 HCHG RX REV CODE 250 W/ 637 OVERRIDE(OP): Performed by: STUDENT IN AN ORGANIZED HEALTH CARE EDUCATION/TRAINING PROGRAM

## 2025-07-20 PROCEDURE — A9270 NON-COVERED ITEM OR SERVICE: HCPCS | Performed by: STUDENT IN AN ORGANIZED HEALTH CARE EDUCATION/TRAINING PROGRAM

## 2025-07-20 RX ORDER — METRONIDAZOLE 500 MG/1
500 TABLET ORAL ONCE
Status: COMPLETED | OUTPATIENT
Start: 2025-07-20 | End: 2025-07-20

## 2025-07-20 RX ORDER — LOPERAMIDE HYDROCHLORIDE 2 MG/1
2 CAPSULE ORAL ONCE
Status: COMPLETED | OUTPATIENT
Start: 2025-07-20 | End: 2025-07-20

## 2025-07-20 RX ORDER — METRONIDAZOLE 500 MG/1
500 TABLET ORAL 2 TIMES DAILY
Qty: 14 TABLET | Refills: 0 | Status: ACTIVE | OUTPATIENT
Start: 2025-07-20 | End: 2025-07-27

## 2025-07-20 RX ADMIN — METRONIDAZOLE 500 MG: 500 TABLET ORAL at 00:18

## 2025-07-20 RX ADMIN — LOPERAMIDE HYDROCHLORIDE 2 MG: 2 CAPSULE ORAL at 00:18

## 2025-07-20 NOTE — ED PROVIDER NOTES
"ED Provider Note    CHIEF COMPLAINT  Chief Complaint   Patient presents with    Vaginal Pain     Pt is c/o of pain when she is having intercourse. This is something new started 4 days ago. Pt is concerned that she was exposure maybe to STD, but more concern about what is causing the pain. Pt is having is having white discharge. Pt is having lower back pain.        EXTERNAL RECORDS REVIEWED      HPI/ROS  LIMITATION TO HISTORY   Select: : None  OUTSIDE HISTORIAN(S):      Kathie Vazquez is a 25 y.o. female who presents for increased vaginal discomfort with sexual intercourse ongoing for the past 4 days.  During this time she has also noted some white discharge and mild itching.  Denies fevers.  Reports she is in a monogamous relationship with her  and has no concern for STD.    PAST MEDICAL HISTORY       SURGICAL HISTORY  patient denies any surgical history    FAMILY HISTORY  No family history on file.    SOCIAL HISTORY  Social History     Tobacco Use    Smoking status: Never    Smokeless tobacco: Never   Substance and Sexual Activity    Alcohol use: Not Currently    Drug use: Never    Sexual activity: Not on file       CURRENT MEDICATIONS  Home Medications       Reviewed by Garth Astudillo R.N. (Registered Nurse) on 07/19/25 at 2154  Med List Status: Partial     Medication Last Dose Status   ondansetron (ZOFRAN ODT) 4 MG TABLET DISPERSIBLE  Active                    ALLERGIES  Allergies[1]    PHYSICAL EXAM  VITAL SIGNS: BP 93/66   Pulse 79   Temp 37.1 °C (98.8 °F) (Temporal)   Resp 16   Ht 1.65 m (5' 4.96\")   Wt 61.1 kg (134 lb 11.2 oz)   SpO2 98%   BMI 22.44 kg/m²    Physical Exam  Vitals and nursing note reviewed.   Constitutional:       Appearance: She is well-developed.   HENT:      Head: Normocephalic.   Eyes:      Extraocular Movements: Extraocular movements intact.      Pupils: Pupils are equal, round, and reactive to light.   Cardiovascular:      Rate and Rhythm: Normal rate and regular rhythm. "   Pulmonary:      Effort: Pulmonary effort is normal.      Breath sounds: Normal breath sounds.   Abdominal:      Palpations: Abdomen is soft.      Tenderness: There is no abdominal tenderness.   Musculoskeletal:      Cervical back: Normal range of motion.   Neurological:      Mental Status: She is alert and oriented to person, place, and time.           EKG/LABS  Labs Reviewed   WET PREP           COURSE & MEDICAL DECISION MAKING    ASSESSMENT, COURSE AND PLAN  Care Narrative: 25-year-old female presenting for vaginal irritation and white discharge.  Appears well on exam pelvic exam refused by patient.  Wet prep obtained and notable for moderate clue cells and no yeast.  No STD concern per patient as she is in monogamous relationship with .  Will treat for BV with Flagyl at this time return precautions given for significant worsening.              ADDITIONAL PROBLEMS MANAGED      DISPOSITION AND DISCUSSIONS    Escalation of care considered, and ultimately not performed:    Barriers to care at this time, including but not limited to: Patient does not have established PCP.     Decision tools and prescription drugs considered including, but not limited to: Antibiotics Flagyl.    FINAL DIAGNOSIS  1. BV (bacterial vaginosis)    2. Vaginal discharge    3. Dyspareunia due to medical condition in female         Electronically signed by: Lakhwinder Newton M.D., 7/20/2025 5:33 AM           [1] No Known Allergies

## 2025-07-20 NOTE — ED TRIAGE NOTES
"Chief Complaint   Patient presents with    Vaginal Pain     Pt is c/o of pain when she is having intercourse. This is something new started 4 days ago. Pt is concerned that she was exposure maybe to STD, but more concern about what is causing the pain. Pt is having is having white discharge. Pt is having lower back pain.      BP 93/66   Pulse 79   Temp 37.1 °C (98.8 °F) (Temporal)   Resp 16   Ht 1.65 m (5' 4.96\")   Wt 61.1 kg (134 lb 11.2 oz)   SpO2 98%   BMI 22.44 kg/m²       "

## 2025-07-20 NOTE — DISCHARGE INSTRUCTIONS
You may purchase Imodium over-the-counter and take it twice daily as needed for diarrhea.  This medication can cause constipation if it is overused

## 2025-07-23 NOTE — ED NOTES
"ED Positive Culture Follow-up/Notification Note:   Date: 7/22/2025  Patient seen in the ED on 7/19/2025 for vaginal pain with intercourse. White discharge noted. Patient refused pelvic exam. Moderate clue cells on wet prep. Vaginal swab obtained and patient was discharged with rx for metronidazole.      1. BV (bacterial vaginosis)    2. Vaginal discharge    3. Dyspareunia due to medical condition in female      Discharge Medication List as of 7/20/2025 12:20 AM        START taking these medications    Details   metroNIDAZOLE (FLAGYL) 500 MG Tab Take 1 Tablet by mouth 2 times a day for 7 days., Disp-14 Tablet, R-0, Normal           Allergies: Patient has no known allergies.    Vitals:    07/19/25 2147 07/19/25 2153   BP:  93/66   Pulse:  79   Resp:  16   Temp:  37.1 °C (98.8 °F)   TempSrc:  Temporal   SpO2:  98%   Weight: 61.1 kg (134 lb 11.2 oz)    Height: 1.65 m (5' 4.96\")        Final cultures:   Results       Procedure Component Value Units Date/Time    WET PREP [[id number]] Collected: 07/19/25 2327    Order Status: Completed Specimen: Genital from Vaginal Updated: 07/19/25 2340     Significant Indicator NEG     Source GEN     Site VAGINAL     Wet Prep For Parasites No yeast.  No motile Trichomonas seen.  Moderate clue cells seen.  Moderate WBCs seen.              Plan:   Symptoms described could be caused by non-gardnerella bacterial vaginosis, as indicated by wet prep, but could also be caused by vulvovaginal candidiasis. Left voicemail with callback number to assess if symptoms are resolving. Letter sent informing of results with callback number if not improving. If symptoms have not improved, would prescribe fluconazole 150mg once.   Carol Combs, PharmD    "